# Patient Record
Sex: MALE | Race: WHITE | NOT HISPANIC OR LATINO | Employment: OTHER | URBAN - METROPOLITAN AREA
[De-identification: names, ages, dates, MRNs, and addresses within clinical notes are randomized per-mention and may not be internally consistent; named-entity substitution may affect disease eponyms.]

---

## 2019-01-20 ENCOUNTER — HOSPITAL ENCOUNTER (EMERGENCY)
Facility: OTHER | Age: 74
Discharge: HOME | End: 2019-01-21
Attending: EMERGENCY MEDICINE
Payer: MEDICARE

## 2019-01-20 DIAGNOSIS — R33.9 URINARY RETENTION: ICD-10-CM

## 2019-01-20 DIAGNOSIS — R33.8 ACUTE URINARY RETENTION: Primary | ICD-10-CM

## 2019-01-20 DIAGNOSIS — N99.114 POSTPROCEDURAL MALE URETHRAL STRICTURE: ICD-10-CM

## 2019-01-20 LAB
BACTERIA #/AREA URNS HPF: NORMAL /HPF
BILIRUB UR QL STRIP: NEGATIVE
CLARITY UR: CLEAR
COLOR UR: YELLOW
GLUCOSE UR QL STRIP: NEGATIVE
HGB UR QL STRIP: ABNORMAL
KETONES UR QL STRIP: NEGATIVE
LEUKOCYTE ESTERASE UR QL STRIP: ABNORMAL
MICROSCOPIC COMMENT: NORMAL
NITRITE UR QL STRIP: NEGATIVE
PH UR STRIP: 6 [PH] (ref 5–8)
PROT UR QL STRIP: NEGATIVE
RBC #/AREA URNS HPF: 2 /HPF (ref 0–4)
SP GR UR STRIP: <=1.005 (ref 1–1.03)
SQUAMOUS #/AREA URNS HPF: 2 /HPF
URN SPEC COLLECT METH UR: ABNORMAL
UROBILINOGEN UR STRIP-ACNC: NEGATIVE EU/DL
WBC #/AREA URNS HPF: 1 /HPF (ref 0–5)
WBC CLUMPS URNS QL MICRO: NORMAL
YEAST URNS QL MICRO: NORMAL

## 2019-01-20 PROCEDURE — 99283 EMERGENCY DEPT VISIT LOW MDM: CPT

## 2019-01-20 PROCEDURE — 81000 URINALYSIS NONAUTO W/SCOPE: CPT

## 2019-01-21 ENCOUNTER — TELEPHONE (OUTPATIENT)
Dept: UROLOGY | Facility: CLINIC | Age: 74
End: 2019-01-21

## 2019-01-21 VITALS
HEART RATE: 76 BPM | OXYGEN SATURATION: 99 % | SYSTOLIC BLOOD PRESSURE: 155 MMHG | DIASTOLIC BLOOD PRESSURE: 78 MMHG | TEMPERATURE: 99 F | BODY MASS INDEX: 29.82 KG/M2 | RESPIRATION RATE: 18 BRPM | HEIGHT: 73 IN | WEIGHT: 225 LBS

## 2019-01-21 VITALS
HEIGHT: 72 IN | HEART RATE: 64 BPM | TEMPERATURE: 99 F | WEIGHT: 225 LBS | DIASTOLIC BLOOD PRESSURE: 58 MMHG | OXYGEN SATURATION: 97 % | BODY MASS INDEX: 30.48 KG/M2 | SYSTOLIC BLOOD PRESSURE: 131 MMHG | RESPIRATION RATE: 20 BRPM

## 2019-01-21 DIAGNOSIS — T83.9XXA FOLEY CATHETER PROBLEM, INITIAL ENCOUNTER: Primary | ICD-10-CM

## 2019-01-21 PROBLEM — N99.114 POSTPROCEDURAL MALE URETHRAL STRICTURE: Status: ACTIVE | Noted: 2019-01-21

## 2019-01-21 PROBLEM — R33.8 ACUTE URINARY RETENTION: Status: ACTIVE | Noted: 2019-01-21

## 2019-01-21 PROBLEM — R33.9 URINARY RETENTION: Status: ACTIVE | Noted: 2019-01-21

## 2019-01-21 LAB
ALBUMIN SERPL BCP-MCNC: 3.7 G/DL
ALP SERPL-CCNC: 61 U/L
ALT SERPL W/O P-5'-P-CCNC: 31 U/L
ANION GAP SERPL CALC-SCNC: 12 MMOL/L
AST SERPL-CCNC: 27 U/L
BASOPHILS # BLD AUTO: 0.01 K/UL
BASOPHILS NFR BLD: 0.1 %
BILIRUB SERPL-MCNC: 0.5 MG/DL
BUN SERPL-MCNC: 14 MG/DL
CALCIUM SERPL-MCNC: 8.9 MG/DL
CHLORIDE SERPL-SCNC: 104 MMOL/L
CO2 SERPL-SCNC: 20 MMOL/L
CREAT SERPL-MCNC: 1 MG/DL
DIFFERENTIAL METHOD: ABNORMAL
EOSINOPHIL # BLD AUTO: 0.1 K/UL
EOSINOPHIL NFR BLD: 1.3 %
ERYTHROCYTE [DISTWIDTH] IN BLOOD BY AUTOMATED COUNT: 13.1 %
EST. GFR  (AFRICAN AMERICAN): >60 ML/MIN/1.73 M^2
EST. GFR  (NON AFRICAN AMERICAN): >60 ML/MIN/1.73 M^2
GLUCOSE SERPL-MCNC: 98 MG/DL
HCT VFR BLD AUTO: 41.1 %
HGB BLD-MCNC: 14.2 G/DL
LYMPHOCYTES # BLD AUTO: 0.9 K/UL
LYMPHOCYTES NFR BLD: 13.9 %
MCH RBC QN AUTO: 32.2 PG
MCHC RBC AUTO-ENTMCNC: 34.5 G/DL
MCV RBC AUTO: 93 FL
MONOCYTES # BLD AUTO: 0.3 K/UL
MONOCYTES NFR BLD: 4.6 %
NEUTROPHILS # BLD AUTO: 5.4 K/UL
NEUTROPHILS NFR BLD: 80 %
PLATELET # BLD AUTO: 178 K/UL
PMV BLD AUTO: 11.6 FL
POTASSIUM SERPL-SCNC: 4.1 MMOL/L
PROT SERPL-MCNC: 7.3 G/DL
RBC # BLD AUTO: 4.41 M/UL
SODIUM SERPL-SCNC: 136 MMOL/L
WBC # BLD AUTO: 6.71 K/UL

## 2019-01-21 PROCEDURE — 99283 EMERGENCY DEPT VISIT LOW MDM: CPT | Mod: 25,,, | Performed by: UROLOGY

## 2019-01-21 PROCEDURE — 25000003 PHARM REV CODE 250: Performed by: EMERGENCY MEDICINE

## 2019-01-21 PROCEDURE — 53620 DILATE URETHRA STRICTURE: CPT | Mod: ,,, | Performed by: UROLOGY

## 2019-01-21 PROCEDURE — 53620 PR DIL URET STRIC,MALE,INIT,FILIFORM: ICD-10-PCS | Mod: ,,, | Performed by: UROLOGY

## 2019-01-21 PROCEDURE — 80053 COMPREHEN METABOLIC PANEL: CPT

## 2019-01-21 PROCEDURE — 99281 EMR DPT VST MAYX REQ PHY/QHP: CPT

## 2019-01-21 PROCEDURE — 85025 COMPLETE CBC W/AUTO DIFF WBC: CPT

## 2019-01-21 PROCEDURE — 99283 PR EMERGENCY DEPT VISIT,LEVEL III: ICD-10-PCS | Mod: 25,,, | Performed by: UROLOGY

## 2019-01-21 RX ORDER — CIPROFLOXACIN 500 MG/1
500 TABLET ORAL
Status: COMPLETED | OUTPATIENT
Start: 2019-01-21 | End: 2019-01-21

## 2019-01-21 RX ADMIN — CIPROFLOXACIN HYDROCHLORIDE 500 MG: 500 TABLET, FILM COATED ORAL at 02:01

## 2019-01-21 NOTE — HPI
74 y/o male with h/o BPH presented to ER with acute onset urinary retention. Bladder scan > 1000cc. He has h/o TURP, over 10 years ago. RN in ER unable to pass maya due to obstruction. No other c/o.

## 2019-01-21 NOTE — PROCEDURES
Roddy Al is a 73 y.o. male patient.    Temp: 98.7 °F (37.1 °C) (01/21/19 0220)  Pulse: 64 (01/21/19 0204)  Resp: 20 (01/21/19 0220)  BP: (!) 131/58 (01/21/19 0203)  SpO2: 97 % (01/21/19 0204)  Weight: 102.1 kg (225 lb) (01/20/19 2317)  Height: 6' (182.9 cm) (01/20/19 2317)       Bladder Cath  Date/Time: 1/21/2019 10:50 AM  Location procedure was performed: [facility] EMERGENCY DEPARTMENT  Performed by: Miller Mena MD  Authorized by: Miller Mena MD   Pre-operative diagnosis: Urinary retention  Post-operative diagnosis: Urinary retention, urethral stricture  Consent Done: Not Needed  Indications: urethral obstruction and urinary retention  Preparation: Patient was prepped and draped in the usual sterile fashion.  Description of findings: Dense narrow membranous urethral stricture with likely associated false passage   Catheter insertion: indwelling  Catheter type: Maya  Catheter size: 16 Fr  Complicated insertion: yes  Altered anatomy: yes  Altered anatomy details: urethral stricture  Bladder irrigation: no  Number of attempts: 5 or more  Urine characteristics: clear  Technical procedures used: Dilation with filliforms and followers and with Annabella dilators  Complications: No  Estimated blood loss (mL): 0  Specimens: No  Patient tolerance: Patient tolerated the procedure well with no immediate complications      Urethral Dilation:  Initial attempt at maya placement consistent with obstruction from possible stricture. The lumen of the urethra was identified using 3 Cymraes filiform. Followers from 8 Cymraes to 14 Cymraes used to dilate stricture, each time with clear UOP; 16 Cymraes follower would not pass. Unable to pass 12 Cymraes maya. Filiform replaced, with some difficulty, and stricture again dilated, this time to 16 Indian. Still unable to pass 12 Cymraes maya, likely because of false passage. Long Bard filiform/guide wire advanced into the bladder and stricture again dilated to 16 Cymraes  with Annabella dilators; 18 Irish would not pass. 16 Irish Jamul catheter placed over the wire into the bladder with return of clear yellow urine.      Miller Mena  1/21/2019

## 2019-01-21 NOTE — CONSULTS
[facility]  Urology  Consult Note    Patient Name: Roddy Al  MRN: [id number]  Admission Date: 1/20/2019  Hospital Length of Stay: 0   Code Status: No Order   Attending Provider: No att. providers found   Consulting Provider: Miller Mena MD  Primary Care Physician: Primary Doctor No  Principal Problem:<principal problem not specified>    Consults    Subjective:     HPI:  72 y/o male with h/o BPH presented to ER with acute onset urinary retention. Bladder scan > 1000cc. He has h/o TURP, over 10 years ago. RN in ER unable to pass maya due to obstruction. No other c/o.    Past Medical History:   Diagnosis Date    Prostate enlargement        No past surgical history on file.    Review of patient's allergies indicates:   Allergen Reactions    Contrast media        Family History     None          Tobacco Use    Smoking status: Not on file   Substance and Sexual Activity    Alcohol use: Not on file    Drug use: Not on file    Sexual activity: Not on file       Review of Systems   Constitutional: Negative for appetite change, chills and fever.   HENT: Negative for sneezing and sore throat.    Eyes: Negative for visual disturbance.   Respiratory: Negative for chest tightness and shortness of breath.    Cardiovascular: Negative for chest pain and leg swelling.   Gastrointestinal: Negative for abdominal distention, nausea and vomiting.   Genitourinary: Positive for difficulty urinating.   Musculoskeletal: Negative for arthralgias and neck pain.   Skin: Negative for color change.   Neurological: Negative for dizziness and seizures.   Psychiatric/Behavioral: Negative for hallucinations.       Objective:     Temp:  [97.5 °F (36.4 °C)-98.7 °F (37.1 °C)] 98.7 °F (37.1 °C)  Pulse:  [64-76] 64  Resp:  [18-20] 20  SpO2:  [97 %-99 %] 97 %  BP: (131-151)/(58-93) 131/58     Body mass index is 30.52 kg/m².       Bladder Scan Volume (mL): (>999) (01/20/19 1977)    Drains     Drain                  Urethral Catheter 01/21/19 0142 Coude 16 Fr. less than 1 day                Physical Exam   Constitutional: He appears well-developed and well-nourished. No distress.   HENT:   Head: Normocephalic and atraumatic.   Eyes: Conjunctivae and EOM are normal. Pupils are equal, round, and reactive to light. Right eye exhibits no discharge. Left eye exhibits no discharge.   Neck: Normal range of motion. Neck supple. No tracheal deviation present. No thyromegaly present.   Cardiovascular: Normal rate, regular rhythm and intact distal pulses.  PMI is not displaced.    Pulmonary/Chest: Effort normal. No accessory muscle usage. No respiratory distress. He exhibits no mass, no tenderness and no crepitus.   Abdominal: Soft. He exhibits no distension and no mass. There is no hepatosplenomegaly. There is no tenderness. No hernia.   Genitourinary: Testes normal and penis normal. Right testis shows no mass and no swelling. Left testis shows no mass and no swelling. No penile erythema or penile tenderness. No discharge found.   Neurological: No cranial nerve deficit or sensory deficit.   Skin: Skin is warm and dry. No rash noted. No erythema.     Psychiatric: He has a normal mood and affect. His speech is normal and behavior is normal. Judgment and thought content normal. His mood appears not anxious. He does not exhibit a depressed mood.       Significant Labs:    BMP:  Recent Labs   Lab 01/21/19  0029      K 4.1      CO2 20*   BUN 14   CREATININE 1.0   CALCIUM 8.9       CBC:  Recent Labs   Lab 01/21/19  0029   WBC 6.71   HGB 14.2   HCT 41.1              Assessment and Plan:     Postprocedural male urethral stricture    Dilated at bedside in ER to 16 Pakistani (see procedure note)  16 Pakistani maya placed over wire  FU later this week for voiding trial     Urinary retention    Maya placed in ER (see procedure note)  Continue flomax  FU later this week for VT         VTE Risk Mitigation (From admission, onward)     None          Miller Mena MD  Urology  [facility]

## 2019-01-21 NOTE — SUBJECTIVE & OBJECTIVE
Past Medical History:   Diagnosis Date    Prostate enlargement        No past surgical history on file.    Review of patient's allergies indicates:   Allergen Reactions    Contrast media        Family History     None          Tobacco Use    Smoking status: Not on file   Substance and Sexual Activity    Alcohol use: Not on file    Drug use: Not on file    Sexual activity: Not on file       Review of Systems   Constitutional: Negative for appetite change, chills and fever.   HENT: Negative for sneezing and sore throat.    Eyes: Negative for visual disturbance.   Respiratory: Negative for chest tightness and shortness of breath.    Cardiovascular: Negative for chest pain and leg swelling.   Gastrointestinal: Negative for abdominal distention, nausea and vomiting.   Genitourinary: Positive for difficulty urinating.   Musculoskeletal: Negative for arthralgias and neck pain.   Skin: Negative for color change.   Neurological: Negative for dizziness and seizures.   Psychiatric/Behavioral: Negative for hallucinations.       Objective:     Temp:  [97.5 °F (36.4 °C)-98.7 °F (37.1 °C)] 98.7 °F (37.1 °C)  Pulse:  [64-76] 64  Resp:  [18-20] 20  SpO2:  [97 %-99 %] 97 %  BP: (131-151)/(58-93) 131/58     Body mass index is 30.52 kg/m².       Bladder Scan Volume (mL): (>999) (01/20/19 2357)    Drains     Drain                 Urethral Catheter 01/21/19 0142 Coude 16 Fr. less than 1 day                Physical Exam   Constitutional: He appears well-developed and well-nourished. No distress.   HENT:   Head: Normocephalic and atraumatic.   Eyes: Conjunctivae and EOM are normal. Pupils are equal, round, and reactive to light. Right eye exhibits no discharge. Left eye exhibits no discharge.   Neck: Normal range of motion. Neck supple. No tracheal deviation present. No thyromegaly present.   Cardiovascular: Normal rate, regular rhythm and intact distal pulses.  PMI is not displaced.    Pulmonary/Chest: Effort normal. No accessory  muscle usage. No respiratory distress. He exhibits no mass, no tenderness and no crepitus.   Abdominal: Soft. He exhibits no distension and no mass. There is no hepatosplenomegaly. There is no tenderness. No hernia.   Genitourinary: Testes normal and penis normal. Right testis shows no mass and no swelling. Left testis shows no mass and no swelling. No penile erythema or penile tenderness. No discharge found.   Neurological: No cranial nerve deficit or sensory deficit.   Skin: Skin is warm and dry. No rash noted. No erythema.     Psychiatric: He has a normal mood and affect. His speech is normal and behavior is normal. Judgment and thought content normal. His mood appears not anxious. He does not exhibit a depressed mood.       Significant Labs:    BMP:  Recent Labs   Lab 01/21/19  0029      K 4.1      CO2 20*   BUN 14   CREATININE 1.0   CALCIUM 8.9       CBC:  Recent Labs   Lab 01/21/19  0029   WBC 6.71   HGB 14.2   HCT 41.1

## 2019-01-21 NOTE — TELEPHONE ENCOUNTER
Spoke to patient son and asked if they could move the appointment early in the am .Patient son states he has to work and patient was informed that it would be a voiding trial which may take a while for the patient to urinate.

## 2019-01-21 NOTE — ASSESSMENT & PLAN NOTE
Dilated at bedside in ER to 16 Ugandan (see procedure note)  16 Ugandan maya placed over wire  FU later this week for voiding trial

## 2019-01-21 NOTE — ED PROVIDER NOTES
"Encounter Date: 1/20/2019    SCRIBE #1 NOTE: I, Hyun Hill, am scribing for, and in the presence of, Dr. Jovel.       History     Chief Complaint   Patient presents with    Urinary Retention     urinated about an hour ago small amount, Hx prostate issues     Time seen by provider: 11:45 PM    This is a 73 y.o. male with history of "prostate problems" who presents with complaint of urinary retention tonight. He has felt at baseline this week. He feels chronic urgency, but has had decreased urine output acutely tonight. He denies hematuria. He is noncompliant with 'prostate medication," unsure of name. He reports drinking beer today and denies use of tobacco. He denies history of HTN or DM. He reports PSHx of prostate resection. He is visiting his son from [place] for the next week.      The history is provided by the patient.     Review of patient's allergies indicates:   Allergen Reactions    Contrast media      Past Medical History:   Diagnosis Date    Prostate enlargement      No past surgical history on file.  No family history on file.  Social History     Tobacco Use    Smoking status: Not on file   Substance Use Topics    Alcohol use: Not on file    Drug use: Not on file     Review of Systems   Constitutional: Negative for chills and fever.   HENT: Negative for congestion and sore throat.    Eyes: Negative for visual disturbance.   Respiratory: Negative for cough and shortness of breath.    Cardiovascular: Negative for chest pain and palpitations.   Gastrointestinal: Negative for abdominal pain, diarrhea and vomiting.   Genitourinary: Positive for decreased urine volume and urgency. Negative for dysuria, frequency and hematuria.        Positive for urinary retention.   Musculoskeletal: Negative for joint swelling, neck pain and neck stiffness.   Skin: Negative for rash and wound.   Neurological: Negative for weakness, numbness and headaches.   Psychiatric/Behavioral: Negative for behavioral problems and " confusion.       Physical Exam     Initial Vitals [01/20/19 2317]   BP Pulse Resp Temp SpO2   (!) 151/93 76 18 97.5 °F (36.4 °C) 99 %      MAP       --         Physical Exam    Nursing note and vitals reviewed.  Constitutional: He appears well-developed and well-nourished. He is not diaphoretic. No distress.   HENT:   Head: Normocephalic and atraumatic.   Eyes: Conjunctivae and EOM are normal. Pupils are equal, round, and reactive to light. No scleral icterus.   Neck: Normal range of motion. Neck supple.   Cardiovascular: Normal rate, regular rhythm and normal heart sounds. Exam reveals no gallop and no friction rub.    No murmur heard.  Pulmonary/Chest: Breath sounds normal. No respiratory distress. He has no wheezes. He has no rhonchi. He has no rales.   Abdominal: Soft. There is no tenderness.   Abdomen protuberant.   Musculoskeletal: Normal range of motion. He exhibits edema (trace pretibial bilaterally). He exhibits no tenderness.   Neurological: He is alert and oriented to person, place, and time.   Skin: Skin is warm and dry.   Psychiatric: He has a normal mood and affect. His behavior is normal. Judgment and thought content normal.          Bladder scan shows greater than 1 L of urine present within the bladder.      ED Course   Procedures  Labs Reviewed   URINALYSIS, REFLEX TO URINE CULTURE - Abnormal; Notable for the following components:       Result Value    Specific Gravity, UA <=1.005 (*)     Occult Blood UA 1+ (*)     Leukocytes, UA Trace (*)     All other components within normal limits    Narrative:     Preferred Collection Type->Urine, Clean Catch   CBC W/ AUTO DIFFERENTIAL - Abnormal; Notable for the following components:    RBC 4.41 (*)     MCH 32.2 (*)     Lymph # 0.9 (*)     Gran% 80.0 (*)     Lymph% 13.9 (*)     All other components within normal limits   COMPREHENSIVE METABOLIC PANEL - Abnormal; Notable for the following components:    CO2 20 (*)     All other components within normal limits    URINALYSIS MICROSCOPIC    Narrative:     Preferred Collection Type->Urine, Clean Catch          Imaging Results    None          Medical Decision Making:   Clinical Tests:   Lab Tests: Ordered and Reviewed  ED Management:  12:05 AM - Paged Urology.  12:09 AM - Discussed case with Dr. Mena.    Emergent evaluation of 73-year-old male with history of prostate problems who presents with complaint of acute urinary retention.  Vital signs reveal mild hypertension, afebrile.  On exam his abdomen is protuberant but nontender.  Bladder scan shows greater than 1 L of urine within the bladder.  Nursing staff attempted 4 times to pass various Choi catheters including several coude- all attempts were unsuccessful.  I discussed the case with Urology who came to the bedside.  He performed bedside Choi placement and cystoscopy.  He reported that patient actually had urethral strictures which were dilated.  He recommended a single dose of Cipro in the ED for procedure prophylaxis.  Choi was successfully placed.  The patient was discharged in good condition and encouraged to follow up with Urology later this week.  Screening labs are benign without renal insufficiency, leukocytosis, or evidence of a UTI.            Scribe Attestation:   Scribe #1: I performed the above scribed service and the documentation accurately describes the services I performed. I attest to the accuracy of the note.    Attending Attestation:           Physician Attestation for Scribe:  Physician Attestation Statement for Scribe #1: I, Dr. Jovel, reviewed documentation, as scribed by Hyun Hill in my presence, and it is both accurate and complete.                    Clinical Impression:     1. Acute urinary retention                                 Steph Jovel MD  01/21/19 1480

## 2019-01-21 NOTE — ED NOTES
Attempted 18 koby maya by request of Dr Mena, unsuccessful, Dr Mena notified and states that he will come in to take care of it

## 2019-01-22 ENCOUNTER — NURSE TRIAGE (OUTPATIENT)
Dept: ADMINISTRATIVE | Facility: CLINIC | Age: 74
End: 2019-01-22

## 2019-01-22 NOTE — TELEPHONE ENCOUNTER
Son calling for pt who had maya cath inserted yesterday. Currently has a large bag attached and wants to change it to a leg bag. Has questions about the tubing and attachment.     Reason for Disposition   Nursing judgment    Protocols used: ST NO PROTOCOL CALL: INFORMATION ONLY-[place]

## 2019-01-22 NOTE — ED NOTES
Pt presents with c/o urinary retention. Pt had a catheter placed in yesterday and report he has not had any  Drainage since around 1100. Pt is AAox4. RR are even and unlabored. Skin is warm and dry. Pt is ambulatory with a steady gait.

## 2019-01-22 NOTE — PROGRESS NOTES
Subjective:      Roddy Al is a 73 y.o. male who returns today regarding his maya catheter. The patient is an established patient of Dr. Mena's and is new to me today.    The patient presented to the ED on 1/21 with acute onset of urinary retention. He has a history of TURP >10 years ago. Dr. Mena placed maya in the ED after urethral dilation (16 fr). Returned to the ED again on 1/21 with no output from maya. Maya was irrigated clearing large clots.     The patient presents today for VT. States that he has not taken Flomax for the last week. Hematuria has resolved.     The following portions of the patient's history were reviewed and updated as appropriate: allergies, current medications, past family history, past medical history, past social history, past surgical history and problem list.    Review of Systems  Constitutional: no fever or chills  ENT: no nasal congestion or sore throat  Respiratory: no cough or shortness of breath  Cardiovascular: no chest pain or palpitations  Gastrointestinal: no nausea or vomiting, tolerating diet  Genitourinary: as per HPI  Hematologic/Lymphatic: no easy bruising or lymphadenopathy  Musculoskeletal: no arthralgias or myalgias  Neurological: no seizures or tremors  Behavioral/Psych: no auditory or visual hallucinations     Objective:   Vitals:   Vitals:    01/23/19 1443   BP: (!) 137/91   Pulse: 82       Physical Exam   General: alert and oriented, no acute distress  Head: normocephalic, atraumatic  Neck: supple, no lymphadenopathy, normal ROM, no masses  Respiratory: Symmetric expansion, non-labored breathing  Cardiovascular: regular rate and rhythm, nomal pulses, no peripheral edema  Abdomen: soft, non tender, non distended, no palpable masses, no hernias, no hepatomegaly or splenomegaly  Genitourinary: maya to gravity with clear, yellow urine   Lymphatic: no inguinal nodes  Skin: normal coloration and turgor, no rashes, no suspicious skin lesions  noted  Neuro: alert and oriented x3, no gross deficits  Psych: normal judgment and insight, normal mood/affect and non-anxious    Lab Review   Urinalysis demonstrates : no sample, maya     Lab Results   Component Value Date    WBC 6.71 01/21/2019    HGB 14.2 01/21/2019    HCT 41.1 01/21/2019    MCV 93 01/21/2019     01/21/2019     Lab Results   Component Value Date    CREATININE 1.0 01/21/2019    BUN 14 01/21/2019     No results found for: PSA  Imaging   None    Voiding Trial (Fill/Pull/Void): 150cc of sterile saline was instilled into the bladder through the previously placed maya catheter.  The maya balloon was then deflated and the maya removed.  The patient subsequently voided 150cc, consistent with successful voiding trial.  The maya was not replaced.    Assessment:   Acute urinary retention  History of urethral stricture   Plan:   Roddy was seen today for other.    Diagnoses and all orders for this visit:    Acute urinary retention    History of urethral stricture    Benign prostatic hyperplasia without lower urinary tract symptoms  -     tamsulosin (FLOMAX) 0.4 mg Cap; Take 1 capsule (0.4 mg total) by mouth once daily. for 10 days    Dysuria  -     phenazopyridine (PYRIDIUM) 200 MG tablet; Take 1 tablet (200 mg total) by mouth 3 (three) times daily as needed.    Plan:  --Successful VT  --Resume flomax  --Pyridium PRN  --Follow up with urologist in [place]

## 2019-01-22 NOTE — ED NOTES
Irrigated maya catheter with 50cc of sterile water, 50 cc of sterile water drawn back, some clots noted in urine, urine flowing after irrigation.

## 2019-01-22 NOTE — ED PROVIDER NOTES
"Encounter Date: 1/21/2019    SCRIBE #1 NOTE: I, Julio Marroquin, am scribing for, and in the presence of, Dr. Bergman.       History     Chief Complaint   Patient presents with    Urinary Retention     urethral catheter placed yesterday by [facility], blood noted in bag and no drainage noted x6 hours     Time seen by provider: 6:06 PM    This is a 73 y.o. male who presents with complaint of urinary retention that began this afternoon. The patient was seen on 1/20/19 for similar symptoms and Dr. Mena had to place a catheter. He has a follow up appointment with Dr. Mena on Wednesday. He tried to drained the bag at approximately ten this morning, hasn't drained it since. He reports that he is only experiencing a burning like pain to his penis. He denies fever, sore throat, chest pain, shortness of breath, nausea, and dysuria.       The history is provided by the patient.     Review of patient's allergies indicates:   Allergen Reactions    Contrast media      Past Medical History:   Diagnosis Date    Prostate enlargement      No past surgical history on file.  No family history on file.  Social History     Tobacco Use    Smoking status: Not on file   Substance Use Topics    Alcohol use: Not on file    Drug use: Not on file     Review of Systems   Constitutional: Negative for fever.   HENT: Negative for sore throat.    Respiratory: Negative for shortness of breath.    Cardiovascular: Negative for chest pain.   Gastrointestinal: Negative for nausea.   Genitourinary: Positive for penile pain ("burning"). Negative for dysuria.   Musculoskeletal: Negative for back pain.   Skin: Negative for rash.   Neurological: Negative for weakness.   Hematological: Does not bruise/bleed easily.       Physical Exam     Initial Vitals [01/21/19 1744]   BP Pulse Resp Temp SpO2   (!) 155/78 76 18 98.6 °F (37 °C) 99 %      MAP       --         Physical Exam    Nursing note and vitals reviewed.  Constitutional: He appears well-developed " and well-nourished. He is not diaphoretic. No distress.   HENT:   Head: Normocephalic and atraumatic.   Eyes: Conjunctivae and EOM are normal. Pupils are equal, round, and reactive to light.   Cardiovascular: Normal rate, regular rhythm and normal heart sounds. Exam reveals no gallop and no friction rub.    No murmur heard.  Pulmonary/Chest: Breath sounds normal. No respiratory distress. He has no wheezes. He has no rhonchi. He has no rales.   Abdominal: Soft. He exhibits no distension. There is no tenderness. There is no rebound, no guarding and no CVA tenderness.   Obese abdomen.   Genitourinary: Penis normal. Uncircumcised.   Genitourinary Comments: Exam preformed with chaperone present. Dried blood at urethral meatus.  Choi catheter in place   Neurological: He is alert and oriented to person, place, and time.   Skin: Skin is warm and dry. No rash and no abscess noted. No erythema. No pallor.   Psychiatric: He has a normal mood and affect. His behavior is normal. Judgment and thought content normal.         ED Course   Procedures  Labs Reviewed - No data to display       Imaging Results    None          Medical Decision Making:   Initial Assessment:   Urgent evaluation of 73-year-old gentleman here with concern for Choi obstruction, given no urinary output x 6 hours.  Patient was seen here yesterday for acute urinary retention, and found to have severe urethral strictures requiring dilation, and Choi placement by Dr. Amador- Urology.  Patient was doing well until this afternoon when he had return his symptoms.  Nursing staff flushed the Choi, with expulsion of large clots from catheter, now with 400cc clear urine in new bag, no UTI on yesterday sample.  Patient encouraged to follow with Dr. Amador as scheduled on Wednesday, and now aware of strict return precautions for recurrence of obstruction, abdominal pain, fevers.            Scribe Attestation:   Scribe #1: I performed the above scribed service and the  documentation accurately describes the services I performed. I attest to the accuracy of the note.    Attending Attestation:           Physician Attestation for Scribe:  Physician Attestation Statement for Scribe #1: I, Dr. Bergman, reviewed documentation, as scribed by Julio Marroquin in my presence, and it is both accurate and complete.                    Clinical Impression:     1. Choi catheter problem, initial encounter            Disposition:   Disposition: Discharged  Condition: Fair                        Puja Bergman MD  01/21/19 2011

## 2019-01-23 ENCOUNTER — OFFICE VISIT (OUTPATIENT)
Dept: UROLOGY | Facility: CLINIC | Age: 74
End: 2019-01-23
Payer: MEDICARE

## 2019-01-23 VITALS
BODY MASS INDEX: 29.82 KG/M2 | HEIGHT: 73 IN | SYSTOLIC BLOOD PRESSURE: 137 MMHG | WEIGHT: 225 LBS | HEART RATE: 82 BPM | DIASTOLIC BLOOD PRESSURE: 91 MMHG

## 2019-01-23 DIAGNOSIS — R30.0 DYSURIA: ICD-10-CM

## 2019-01-23 DIAGNOSIS — R33.8 ACUTE URINARY RETENTION: Primary | ICD-10-CM

## 2019-01-23 DIAGNOSIS — Z87.448 HISTORY OF URETHRAL STRICTURE: ICD-10-CM

## 2019-01-23 DIAGNOSIS — N40.0 BENIGN PROSTATIC HYPERPLASIA WITHOUT LOWER URINARY TRACT SYMPTOMS: ICD-10-CM

## 2019-01-23 PROCEDURE — 51700 PR IRRIGATION, BLADDER: ICD-10-PCS | Mod: S$GLB,,, | Performed by: NURSE PRACTITIONER

## 2019-01-23 PROCEDURE — 99214 PR OFFICE/OUTPT VISIT, EST, LEVL IV, 30-39 MIN: ICD-10-PCS | Mod: 25,S$GLB,, | Performed by: NURSE PRACTITIONER

## 2019-01-23 PROCEDURE — 51700 IRRIGATION OF BLADDER: CPT | Mod: S$GLB,,, | Performed by: NURSE PRACTITIONER

## 2019-01-23 PROCEDURE — 99214 OFFICE O/P EST MOD 30 MIN: CPT | Mod: 25,S$GLB,, | Performed by: NURSE PRACTITIONER

## 2019-01-23 RX ORDER — TAMSULOSIN HYDROCHLORIDE 0.4 MG/1
0.4 CAPSULE ORAL DAILY
Qty: 10 CAPSULE | Refills: 0 | Status: SHIPPED | OUTPATIENT
Start: 2019-01-23 | End: 2019-02-02

## 2019-01-23 RX ORDER — PHENAZOPYRIDINE HYDROCHLORIDE 200 MG/1
200 TABLET, FILM COATED ORAL 3 TIMES DAILY PRN
Qty: 30 TABLET | Refills: 0 | Status: SHIPPED | OUTPATIENT
Start: 2019-01-23 | End: 2019-02-02

## 2019-01-29 ENCOUNTER — TELEPHONE (OUTPATIENT)
Dept: UROLOGY | Facility: CLINIC | Age: 74
End: 2019-01-29

## 2019-01-29 NOTE — TELEPHONE ENCOUNTER
----- Message from Maribell Greenberg sent at 1/29/2019  9:30 AM CST -----  Contact: Pt   Name of Who is Calling: CLARICE VIGIL [[id number]]    What is the request in detail: Pt states he only take the phenazopyridine (PYRIDIUM) 200 MG tablet as needed and he wanted to know is that okay. Please call to further discuss and advise.     Can the clinic reply by [facility]: No     [facility] [phone]

## 2019-01-29 NOTE — TELEPHONE ENCOUNTER
I spoke to pt and he is using pyridium PRN only.  I told him this was OK.  He will follow up with urologist in [place].

## 2022-02-23 NOTE — ED NOTES
Attempted catheter insertion with 18 fr maya, 16 fr coude, and 14 fr coude without success. Small amount of bleeding noted after catheter attempts. MD notified. Urology to be paged.   SS received in basket regarding concerns about patient not receiving further treatment. SS spoke with Kayla in financial counseling who states that patient originally applied for [employer] assistance on August 26, 2021.  Patient was requested to bring in bank statement and social security letter.  Kayla states that patient did bring in bank statement, but never brought social security letter.  Without the social security letter, hospital assistance program unable to assist patient.  Kayla says that according to patient's bank statement, he receives $559 per month, which would technically qualify him for [place] Medicaid and possibly food stamps.  Patient was advised to apply to medicaid at that time.  Patient, to this date, has not provided any additional information and has not applied for [place] Medicaid.  Kayla states that patient has been paid in full since January 2022 and has no current bills.    SS called patient and spoke with him about further treatment.  Patient states that he doesn't wish to receive chemotherapy or radiation.  Patient states that it isn't a money issue, he just thinks that the treatment made him feel worse.  SS discussed hospice with him for symptom management at home and patient declines services at this time.  Patient states that he doesn't have any pain and is independent with ADL's.  Patient states that he will let us know if he changes his mind.    SS reinforced financial assistance with patient once more.  Patient continues to declines any services.    SS contacted patient's daughter Autumn [phone] who states that she will talk with patient and see if he is agreeable to applying for medical card.     SS provided daughter with my name and direct line.    SS will follow as needed.